# Patient Record
Sex: MALE | Race: OTHER | HISPANIC OR LATINO | Employment: FULL TIME | URBAN - METROPOLITAN AREA
[De-identification: names, ages, dates, MRNs, and addresses within clinical notes are randomized per-mention and may not be internally consistent; named-entity substitution may affect disease eponyms.]

---

## 2024-06-07 ENCOUNTER — OFFICE VISIT (OUTPATIENT)
Dept: URGENT CARE | Facility: CLINIC | Age: 34
End: 2024-06-07
Payer: COMMERCIAL

## 2024-06-07 VITALS
OXYGEN SATURATION: 98 % | HEIGHT: 70 IN | SYSTOLIC BLOOD PRESSURE: 131 MMHG | HEART RATE: 83 BPM | WEIGHT: 250 LBS | TEMPERATURE: 99 F | RESPIRATION RATE: 18 BRPM | BODY MASS INDEX: 35.79 KG/M2 | DIASTOLIC BLOOD PRESSURE: 76 MMHG

## 2024-06-07 DIAGNOSIS — Z02.6 ENCOUNTER RELATED TO WORKER'S COMPENSATION CLAIM: Primary | ICD-10-CM

## 2024-06-07 DIAGNOSIS — M77.11 RIGHT LATERAL EPICONDYLITIS: ICD-10-CM

## 2024-06-07 DIAGNOSIS — S99.922A INJURY OF LEFT ANKLE AND FOOT, INITIAL ENCOUNTER: ICD-10-CM

## 2024-06-07 DIAGNOSIS — S99.912A INJURY OF LEFT ANKLE AND FOOT, INITIAL ENCOUNTER: ICD-10-CM

## 2024-06-07 PROCEDURE — 73630 X-RAY EXAM OF FOOT: CPT | Mod: LT,S$GLB,, | Performed by: RADIOLOGY

## 2024-06-07 PROCEDURE — 73610 X-RAY EXAM OF ANKLE: CPT | Mod: LT,S$GLB,, | Performed by: RADIOLOGY

## 2024-06-07 PROCEDURE — 99204 OFFICE O/P NEW MOD 45 MIN: CPT | Mod: S$GLB,,, | Performed by: FAMILY MEDICINE

## 2024-06-07 PROCEDURE — 73080 X-RAY EXAM OF ELBOW: CPT | Mod: RT,S$GLB,, | Performed by: RADIOLOGY

## 2024-06-07 RX ORDER — DICLOFENAC SODIUM 10 MG/G
2 GEL TOPICAL 4 TIMES DAILY
Qty: 100 G | Refills: 1 | Status: SHIPPED | OUTPATIENT
Start: 2024-06-07

## 2024-06-07 RX ORDER — METHYLPREDNISOLONE 4 MG/1
TABLET ORAL
Qty: 21 EACH | Refills: 0 | Status: SHIPPED | OUTPATIENT
Start: 2024-06-07

## 2024-06-07 NOTE — LETTER
[facility] [place] [address], [address]  [facility] [address]  Phone: [phone]  Fax: [phone]  [facility]: [email or web address]    Pt Name: Anton Ramirez  Injury Date: 06/05/2024   Employee ID: [id number] Date of First Treatment: 06/07/2024   Company: [employer]      Appointment Time: 01:45 PM Arrived: 250   Provider: Jorje Peralta MD Time Out:420     Office Treatment:   1. Encounter related to worker's compensation claim    2. Right lateral epicondylitis    3. Injury of left ankle and foot, initial encounter      Medications Ordered This Encounter   Medications    diclofenac sodium (VOLTAREN) 1 % Gel    methylPREDNISolone (MEDROL DOSEPACK) 4 mg tablet      Patient Instructions:  (wear elbow brace.)    Restrictions: No lifting/pushing/pulling more than 50 lbs (take breaks as needed. May advance to regular duty as tolerated.)     Return Appointment: 6/14 or sooner if needed     If Rx a medication that can be sedating, DO NOT TAKE DURING YOUR WORK DAY.    Some OTC measures to help in recovery(if no allergies to, renal issues or pregnant):  Tylenol 325mg 3x per day  Ibuprofen 400mg 3x per day OR Aleve regular strength one tablet 2x per day  Take Pepcid 20mg BID  If applicable or discussed: Magnesium OTC daily; Topical Voltaren Gel; Lidocaine patches, neoprene OTC compression sleeve  Massage area if possible  Resting of the injured area  Ice for ankle, wrist or elbow injury  Elevation of the injured area if applicable  Heating pad for muscle injury  Stretching/ROM exercises as described in clinic.   ** BE ADVISED: You should be in regular contact with your W/C  to know the status of your claim and /or (if any)pending referrals**

## 2024-06-21 ENCOUNTER — TELEPHONE (OUTPATIENT)
Dept: URGENT CARE | Facility: CLINIC | Age: 34
End: 2024-06-21
Payer: COMMERCIAL

## 2024-06-24 ENCOUNTER — OFFICE VISIT (OUTPATIENT)
Dept: URGENT CARE | Facility: CLINIC | Age: 34
End: 2024-06-24
Payer: COMMERCIAL

## 2024-06-24 VITALS
HEIGHT: 70 IN | OXYGEN SATURATION: 98 % | HEART RATE: 80 BPM | WEIGHT: 250 LBS | DIASTOLIC BLOOD PRESSURE: 77 MMHG | RESPIRATION RATE: 15 BRPM | BODY MASS INDEX: 35.79 KG/M2 | SYSTOLIC BLOOD PRESSURE: 128 MMHG

## 2024-06-24 DIAGNOSIS — M77.11 RIGHT LATERAL EPICONDYLITIS: ICD-10-CM

## 2024-06-24 DIAGNOSIS — Z02.6 ENCOUNTER RELATED TO WORKER'S COMPENSATION CLAIM: Primary | ICD-10-CM

## 2024-06-24 DIAGNOSIS — S99.912A INJURY OF LEFT ANKLE AND FOOT, INITIAL ENCOUNTER: ICD-10-CM

## 2024-06-24 DIAGNOSIS — S99.922A INJURY OF LEFT ANKLE AND FOOT, INITIAL ENCOUNTER: ICD-10-CM

## 2024-06-24 PROCEDURE — 99213 OFFICE O/P EST LOW 20 MIN: CPT | Mod: S$GLB,,, | Performed by: FAMILY MEDICINE

## 2024-06-24 NOTE — PROGRESS NOTES
Subjective:      Patient ID: Anton Ramirez is a 33 y.o. male.    Chief Complaint: Ankle Injury    Patient works at  [employer] and patient's job is   Date of initial injury: 06/05/2024   Description of injury: Patient was unloading a 400 lbs box and placed it on a cart. The pt was pulling the cart from behind him and it rammed up against his ankle, causing swelling and pain towards the arch of his foot- hit posteriorly with foot plantar flexed. Patient was lifting heavy boxes an he felt a pop in his right elbow.  What have you taken OTC for your symptoms: ibuprofen  What is your current pain scale out of 10?  0  If this is a return visit, do you feel that you have improved since your initial injury? Patient states that he is feeling better, no pain.       Ankle Injury   The incident occurred more than 1 week ago. The incident occurred at work. The pain is at a severity of 0/10. The patient is experiencing no pain. Pertinent negatives include no inability to bear weight, loss of motion, loss of sensation, muscle weakness, numbness or tingling. He has tried nothing for the symptoms.     Neurological:  Negative for numbness.     Objective:     Physical Exam   FROM without pain of the right elbow and left ft foot/ankle.   Assessment:      1. Encounter related to worker's compensation claim    2. Right lateral epicondylitis    3. Injury of left ankle and foot, initial encounter      Plan:              Restrictions: Regular Duty, Discharged from Occupational Health  No follow-ups on file.

## 2024-06-24 NOTE — LETTER
[facility] [place] [address], [address]  [facility] [address]  Phone: [phone]  Fax: [phone]  [facility]: [phone]    Pt Name: Anton Ramirez  Injury Date: 06/05/2024   Employee ID: [id number] Date of Treatment: 06/24/2024   Company: [employer]      Appointment Time: 03:15 PM Arrived: 330   Provider: Jorje Peralta MD Time Out:400     Office Treatment:   1. Encounter related to worker's compensation claim    2. Right lateral epicondylitis    3. Injury of left ankle and foot, initial encounter               Restrictions: Regular Duty, Discharged from Occupational Health